# Patient Record
Sex: FEMALE | Race: WHITE | NOT HISPANIC OR LATINO | Employment: FULL TIME | URBAN - METROPOLITAN AREA
[De-identification: names, ages, dates, MRNs, and addresses within clinical notes are randomized per-mention and may not be internally consistent; named-entity substitution may affect disease eponyms.]

---

## 2017-10-18 ENCOUNTER — TELEPHONE (OUTPATIENT)
Dept: MIDWIFE SERVICES | Facility: CLINIC | Age: 47
End: 2017-10-18

## 2023-09-07 ENCOUNTER — OFFICE VISIT (OUTPATIENT)
Dept: URGENT CARE | Facility: URGENT CARE | Age: 53
End: 2023-09-07

## 2023-09-07 VITALS
SYSTOLIC BLOOD PRESSURE: 124 MMHG | HEART RATE: 75 BPM | BODY MASS INDEX: 29.12 KG/M2 | DIASTOLIC BLOOD PRESSURE: 80 MMHG | OXYGEN SATURATION: 97 % | WEIGHT: 175 LBS | TEMPERATURE: 98.6 F

## 2023-09-07 DIAGNOSIS — R10.11 RUQ ABDOMINAL PAIN: Primary | ICD-10-CM

## 2023-09-07 PROCEDURE — 99207 PR NO CHARGE LOS: CPT | Performed by: FAMILY MEDICINE

## 2023-09-07 NOTE — PROGRESS NOTES
Assessment & Plan     RUQ abdominal pain    Recommend eval in ED with persistent RUQ pain-- imaging needed and labs unable to obtain same-day in UC.  No charge for UC visit.    Anya Perkins MD  [facility] [place]    Eli Carrero is a 53 year old, presenting for the following health issues:  Urgent Care (Monday had diarrhea after having a  drink ( fruit drink from [employer]) , felt better after 24 hrs, yesterday and today having right upper abdominal pain ( dull ache)  /No meds taken )      HPI     Had a fruity drink from [employer] on Monday and had immediate severe diarrhea x 24 hours.  Felt much better after but now with persistent dull ache in RUOQ.  Has not tried any meds.  No N/V.  No fever.  Tolerating po well.    Review of Systems   Constitutional, HEENT, cardiovascular, pulmonary, GI, , musculoskeletal, neuro, skin, endocrine and psych systems are negative, except as otherwise noted.      Objective    /80   Pulse 75   Temp 98.6  F (37  C) (Tympanic)   Wt 79.4 kg (175 lb)   SpO2 97%   BMI 29.12 kg/m    Body mass index is 29.12 kg/m .  Physical Exam   GENERAL: healthy, alert and no distress  EYES: Eyes grossly normal to inspection, PERRL and conjunctivae and sclerae normal  ABDOMEN: tenderness RUOQ with normal bowel sounds, no tenderness in RUQ just lateral to the sternum  PSYCH: mentation appears normal, affect normal/bright